# Patient Record
Sex: MALE
[De-identification: names, ages, dates, MRNs, and addresses within clinical notes are randomized per-mention and may not be internally consistent; named-entity substitution may affect disease eponyms.]

---

## 2022-05-23 ENCOUNTER — NURSE TRIAGE (OUTPATIENT)
Dept: OTHER | Facility: CLINIC | Age: 47
End: 2022-05-23

## 2022-05-23 NOTE — TELEPHONE ENCOUNTER
Subjective: Caller states \"About a week ago I started with some pressure in rectal area. Zina had hemorrhoids for years. The symptoms eased up so I canceled my appt. Yesterday the pressure was back and the pain is so bad that I had to crawl to get to bed. I have had nausea and sweats. \"     Current Symptoms: Rectal pressure, pain, bleeding with wiping     Onset: 1 week ago; worsening    Associated Symptoms: reduced activity, reduced appetite    Pain Severity: 6/10; burning, pressure; constant and if coughing or straining it is 10/10 sharp shooting pain     Temperature: denies     What has been tried: tylenol extra strength, aleve       Recommended disposition: Go to Office Now    Care advice provided, patient verbalizes understanding; denies any other questions or concerns; instructed to call back for any new or worsening symptoms. Patient/caller agrees to follow-up with PCP to try to be seen now and if not will proceed to [address] to be seen     This triage is a result of a call to [address] [place] [address]. Please do not respond to the triage nurse through this encounter. Any subsequent communication should be directly with the patient. Reminders:    Be Sure to use [employer] [facility] if appropriate     Be sure to verify MMO ID, Name, and  against [employer] for ALL callers     Care Advice - both instruction and documentation    Call origin   MMO ID found in [employer]: Client MMO, be sure to document MMO ID (digits ONLY) in comment section of call origin row    MMO ID, Name/ NOT found in [employer]: Non-participant MMO Caller, leave comment section of call origin row BLANK    Closing script - If there is nothing else I can help you with? --- Please remain on the line after I disconnect to complete a brief 1 question survey. We appreciate your feedback. Thank you for allowing [facility] to partner in your care.     PLEASE DELETE ALL RED TEXT PRIOR TO SIGNING NOTE    Reason for Disposition   Severe rectal pain    Protocols used: RECTAL SYMPTOMS-ADULT-[place]

## 2024-01-22 DIAGNOSIS — I10 HYPERTENSION, UNSPECIFIED TYPE: Primary | ICD-10-CM

## 2024-01-22 RX ORDER — PROPRANOLOL HYDROCHLORIDE 60 MG/1
60 CAPSULE, EXTENDED RELEASE ORAL DAILY
COMMUNITY
Start: 2023-07-24 | End: 2024-01-22 | Stop reason: SDUPTHER

## 2024-01-22 NOTE — TELEPHONE ENCOUNTER
Patient has to get  a new PCP due to now working for [employer].  That appointment is in one month would you fill his Propranolol ER 60mg 24 hour capsule takes one a day.  Pharmacy [facility] [id number] , [id number] [facility] [id number]

## 2024-01-23 RX ORDER — PROPRANOLOL HYDROCHLORIDE 60 MG/1
60 CAPSULE, EXTENDED RELEASE ORAL DAILY
Qty: 90 CAPSULE | Refills: 0 | Status: SHIPPED | OUTPATIENT
Start: 2024-01-23 | End: 2024-04-22